# Patient Record
Sex: FEMALE | Race: WHITE | NOT HISPANIC OR LATINO | Employment: OTHER | URBAN - METROPOLITAN AREA
[De-identification: names, ages, dates, MRNs, and addresses within clinical notes are randomized per-mention and may not be internally consistent; named-entity substitution may affect disease eponyms.]

---

## 2023-05-20 ENCOUNTER — HOSPITAL ENCOUNTER (EMERGENCY)
Facility: HOSPITAL | Age: 73
Discharge: HOME | End: 2023-05-20
Attending: EMERGENCY MEDICINE | Admitting: EMERGENCY MEDICINE
Payer: MEDICARE

## 2023-05-20 ENCOUNTER — APPOINTMENT (OUTPATIENT)
Dept: CT IMAGING | Facility: HOSPITAL | Age: 73
End: 2023-05-20
Attending: EMERGENCY MEDICINE
Payer: MEDICARE

## 2023-05-20 ENCOUNTER — APPOINTMENT (OUTPATIENT)
Dept: RADIOLOGY | Facility: HOSPITAL | Age: 73
End: 2023-05-20
Attending: STUDENT IN AN ORGANIZED HEALTH CARE EDUCATION/TRAINING PROGRAM
Payer: MEDICARE

## 2023-05-20 VITALS
DIASTOLIC BLOOD PRESSURE: 63 MMHG | WEIGHT: 200 LBS | HEIGHT: 63 IN | BODY MASS INDEX: 35.44 KG/M2 | TEMPERATURE: 96.9 F | SYSTOLIC BLOOD PRESSURE: 136 MMHG | RESPIRATION RATE: 18 BRPM | OXYGEN SATURATION: 99 % | HEART RATE: 57 BPM
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
## 2023-05-20 DIAGNOSIS — S80.02XA CONTUSION OF LEFT KNEE, INITIAL ENCOUNTER: ICD-10-CM

## 2023-05-20 DIAGNOSIS — S42.291A OTHER CLOSED DISPLACED FRACTURE OF PROXIMAL END OF RIGHT HUMERUS, INITIAL ENCOUNTER: ICD-10-CM

## 2023-05-20 PROBLEM — I10 ESSENTIAL HYPERTENSION: Status: ACTIVE | Noted: 2022-08-24

## 2023-05-20 PROBLEM — E66.9 OBESITY (BMI 30-39.9): Status: ACTIVE | Noted: 2018-02-20

## 2023-05-20 PROBLEM — I83.813 VARICOSE VEINS OF BILATERAL LOWER EXTREMITIES WITH PAIN: Status: ACTIVE | Noted: 2023-02-10

## 2023-05-20 PROBLEM — E03.9 HYPOTHYROID: Status: ACTIVE | Noted: 2022-08-24

## 2023-05-20 PROBLEM — I48.91 ATRIAL FIBRILLATION (H): Status: ACTIVE | Noted: 2023-05-20

## 2023-05-20 LAB
BASOPHILS # BLD AUTO: 0.1 10E3/UL (ref 0–0.2)
BASOPHILS NFR BLD AUTO: 1 %
EOSINOPHIL # BLD AUTO: 0.2 10E3/UL (ref 0–0.7)
EOSINOPHIL NFR BLD AUTO: 2 %
ERYTHROCYTE [DISTWIDTH] IN BLOOD BY AUTOMATED COUNT: 13.8 % (ref 10–15)
HCT VFR BLD AUTO: 42 % (ref 35–47)
HGB BLD-MCNC: 13.4 G/DL (ref 11.7–15.7)
HOLD SPECIMEN: NORMAL
IMM GRANULOCYTES # BLD: 0 10E3/UL
IMM GRANULOCYTES NFR BLD: 0 %
LYMPHOCYTES # BLD AUTO: 1.6 10E3/UL (ref 0.8–5.3)
LYMPHOCYTES NFR BLD AUTO: 16 %
MCH RBC QN AUTO: 28.1 PG (ref 26.5–33)
MCHC RBC AUTO-ENTMCNC: 31.9 G/DL (ref 31.5–36.5)
MCV RBC AUTO: 88 FL (ref 78–100)
MONOCYTES # BLD AUTO: 0.6 10E3/UL (ref 0–1.3)
MONOCYTES NFR BLD AUTO: 6 %
NEUTROPHILS # BLD AUTO: 7.4 10E3/UL (ref 1.6–8.3)
NEUTROPHILS NFR BLD AUTO: 75 %
NRBC # BLD AUTO: 0 10E3/UL
NRBC BLD AUTO-RTO: 0 /100
PLATELET # BLD AUTO: 355 10E3/UL (ref 150–450)
RBC # BLD AUTO: 4.77 10E6/UL (ref 3.8–5.2)
WBC # BLD AUTO: 9.9 10E3/UL (ref 4–11)

## 2023-05-20 PROCEDURE — 250N000013 HC RX MED GY IP 250 OP 250 PS 637: Performed by: EMERGENCY MEDICINE

## 2023-05-20 PROCEDURE — 73060 X-RAY EXAM OF HUMERUS: CPT | Mod: RT

## 2023-05-20 PROCEDURE — 70450 CT HEAD/BRAIN W/O DYE: CPT

## 2023-05-20 PROCEDURE — 73562 X-RAY EXAM OF KNEE 3: CPT | Mod: LT

## 2023-05-20 PROCEDURE — 36415 COLL VENOUS BLD VENIPUNCTURE: CPT | Performed by: STUDENT IN AN ORGANIZED HEALTH CARE EDUCATION/TRAINING PROGRAM

## 2023-05-20 PROCEDURE — 23600 CLTX PROX HUMRL FX W/O MNPJ: CPT | Mod: RT

## 2023-05-20 PROCEDURE — 99285 EMERGENCY DEPT VISIT HI MDM: CPT | Mod: 25

## 2023-05-20 PROCEDURE — 85014 HEMATOCRIT: CPT | Performed by: STUDENT IN AN ORGANIZED HEALTH CARE EDUCATION/TRAINING PROGRAM

## 2023-05-20 PROCEDURE — 85025 COMPLETE CBC W/AUTO DIFF WBC: CPT | Performed by: EMERGENCY MEDICINE

## 2023-05-20 RX ORDER — OXYCODONE AND ACETAMINOPHEN 5; 325 MG/1; MG/1
1 TABLET ORAL ONCE
Status: COMPLETED | OUTPATIENT
Start: 2023-05-20 | End: 2023-05-20

## 2023-05-20 RX ORDER — OXYCODONE HYDROCHLORIDE 5 MG/1
5 TABLET ORAL EVERY 6 HOURS PRN
Qty: 12 TABLET | Refills: 0 | Status: SHIPPED | OUTPATIENT
Start: 2023-05-20 | End: 2023-05-23

## 2023-05-20 RX ADMIN — OXYCODONE HYDROCHLORIDE AND ACETAMINOPHEN 1 TABLET: 5; 325 TABLET ORAL at 14:29

## 2023-05-20 ASSESSMENT — ACTIVITIES OF DAILY LIVING (ADL)
ADLS_ACUITY_SCORE: 35

## 2023-05-20 NOTE — DISCHARGE INSTRUCTIONS
You are seen today for evaluation after a fall.  We did a head CT which showed no acute bleed.  We did a knee x-ray which showed no acute fracture.  We did an x-ray of your right humerus which does show a fracture.  Wear the sling.  You can use the Ace wrap as needed as well for support.  You may benefit from sleeping in a recliner for several days to help with pain control.  Take the oxycodone as needed for pain not relieved by Tylenol.  Follow-up with orthopedics as an outpatient.

## 2023-05-20 NOTE — ED NOTES
"Pt feeling nervous /anxious about taking pain medication.  Wondering if this will be to much and or not enough once the provider starts to manipulate arm.  Pt states arm feels like it just not there numb hard to describe.  +radial pulse, pt able to move fingers, warm to touch.  Pain is upper right arm.  Reassured pt it was ok to take pain pill and that if needed we could always place an iv and give pain meds that way too.  Pt apologetic for \"being indecisive\".  Informed pt no need to apologize.Allowed pt to vent feelings.  Family at bedside.  "

## 2023-05-20 NOTE — ED PROVIDER NOTES
EMERGENCY DEPARTMENT ENCOUNTER      NAME: Clare Benitez  AGE: 72 year old female  YOB: 1950  MRN: [id number]  EVALUATION DATE & TIME: 5/20/2023  1:40 PM    PCP: No Ref-Primary, Physician    ED PROVIDER: Lashay Ash M.D.      Chief Complaint   Patient presents with     Fall     Arm Injury     FINAL IMPRESSION:  1. Fall, initial encounter    2. Other closed displaced fracture of proximal end of right humerus, initial encounter    3. Contusion of left knee, initial encounter        ED COURSE & MEDICAL DECISION MAKING:    Pertinent Labs & Imaging studies reviewed. (See chart for details)  ED Course as of 05/20/23 1858   Sat May 20, 2023   1541 Patient is a pleasant 72-year-old female who comes in today for evaluation of right arm pain as well as some left knee pain.  She reports that she was at a ballet recital at her [employer] watching her grandchildren.  She was walking afterward and has some difficulty with depth perception because of a history of glaucoma and there is a small rise in the concrete and she caught her toe on it and fell.  It sounds like she was a few feet from the door and initially landed on her right arm and then may have hit her head on the glass door a little bit.  She denies any head pain.  She has no obvious signs of trauma.  She is not on any blood thinners.  We discussed risks and benefits of CT imaging and ultimately given her age and the injury today we opted to get a CT scan of her head.  I think it is reasonable and we will make sure she does not have an intracranial hemorrhage.  She is from [place] and will be driving back home in the next few days.  On her x-ray imaging her knee showed some arthritis but no acute fracture.  She had some tenderness to the lateral part of the anterior knee but extensor mechanism is intact.  She has a lot of tenderness to the midshaft humerus with obvious fracture on x-ray.  We did place her in a sling.  I did wrap her with an Ace  wrap.  She is not sure if that feels better or not.  We discussed that this is typically nonoperative management and that it should heal well.  She is right-handed so we will have to use her left hand and get a lot of assistance.  She is here with her son and her .  She did get some Percocet and her pain is pretty well controlled with the Percocet.  We talked about using that for pain control and also making sure she is on a stool softener as it can be quite constipating.  She is comfortable with all that.  The hope would be to discharge her home after that head CT comes back as long as it looks okay.  We talked about sleeping in a recliner as that will help with the pain.  She was comfortable with the plan.   1705 I reviewed the patient's head CT.  It shows no acute bleeding.  We will work on getting the patient discharged home shortly.   1756 Checked back in on the patient.  I reviewed her head CT results which look good.  We will get her discharged home.  We will send a prescription to her pharmacy.       Medical Decision Making    History:    Supplemental history from: Patient, Patient's  and Patient's son  External Record(s) reviewed: I reviewed the patient's cardiology note from 11/28/2022.  She has history of atrial fibrillation and hypertension.  She had had some spontaneous bleeds when she was on warfarin.    Work Up:    Emergent/Severe conditions considered and evaluated for: Intracranial hemorrhage, arm fracture, knee fracture    I independently reviewed and interpreted humerus x-ray which showed an acute displaced fracture    In additional to work up documented, I considered the following work up: None    Medications given that require intensive monitoring for toxicity: None    External consultation:    Discussion of management with another provider: N/A    Complicating factors:    Care impacted by chronic illness: Glaucoma which contributed to her fall today    Care affected by social  determinants of health: None    Disposition considerations: Considered admission given that she has a fracture but her pain was well controlled and she was able to discharge home.  Prescriptions considered/prescribed: Oxycodone    3:14 PM Introduced myself to the patient, obtained history of present illness, and performed initial physical exam at this time.   5:55 PM I rechecked and updated the patient on CT results, we discussed plan of discharge, patient and family are agreeable at this time.       At the conclusion of the encounter I discussed  the results of all of the tests and the disposition with patient.   All questions were answered.  The patient acknowledged understanding and was involved in the decision making regarding the overall care plan.      I discussed with patient the utility, limitations and findings of the exam/interventions/studies done during this visit as well as the list of differential diagnosis and symptoms to monitor/return to ER for.  Additional verbal discharge instructions were provided.     MEDICATIONS GIVEN IN THE EMERGENCY:  Medications   oxyCODONE-acetaminophen (PERCOCET) 5-325 MG per tablet 1 tablet (1 tablet Oral $Given 5/20/23 3433)       NEW PRESCRIPTIONS STARTED AT TODAY'S ER VISIT  New Prescriptions    OXYCODONE (ROXICODONE) 5 MG TABLET    Take 1 tablet (5 mg) by mouth every 6 hours as needed for pain          =================================================================    HPI    Triage Note:   Pt presents with right arm in makeshift ace wrap sling after a fall at a dance recital. Pt fell against a door after tripping on a ledge. Left knee also painful. Able to wiggles fingers, pulse palpable. No blood thinners.     Triage Assessment     Row Name 05/20/23 0964       Triage Assessment (Adult)    Airway WDL WDL       Respiratory WDL    Respiratory WDL WDL       Skin Circulation/Temperature WDL    Skin Circulation/Temperature WDL WDL       Cardiac WDL    Cardiac WDL WDL        Peripheral/Neurovascular WDL    Peripheral Neurovascular WDL WDL       Cognitive/Neuro/Behavioral WDL    Cognitive/Neuro/Behavioral WDL WDL                  Patient information was obtained from: Patient and Patient's     Use of : N/A         Clare Benitez is a 72 year old female, with a past medical history of atrial fibrillation, hypertension, obesity who presents with fall related injuries.     Patient is from [place], visiting her son, today she was at a ballet recital for her grand kids. While going into the building around [id number], she tripped on the edge of the sidewalk and fall onto her right arm, and stumbled into the glass door. They explain that there was a medical doctor on the scene, who was able to stabilize her right arm with an ACE wrap. She additionally notes pain/soreness in her knees, but notes that she has had a long history of knee problems and should get replacements, but hasn't done so.     She denies blood thinners, states she uses low dose baby aspirin. She notes she was on blood thinners for a while for atrial fibrillation.     Patient attributes this fall, to her issues with depth perception as she has glaucoma and eye surgeries. She states she has fallen for this reason before.    PAST MEDICAL HISTORY:  No past medical history on file.    PAST SURGICAL HISTORY:  No past surgical history on file.    CURRENT MEDICATIONS:    No current facility-administered medications for this encounter.    Current Outpatient Medications:      oxyCODONE (ROXICODONE) 5 MG tablet, Take 1 tablet (5 mg) by mouth every 6 hours as needed for pain, Disp: 12 tablet, Rfl: 0    ALLERGIES:  Allergies   Allergen Reactions     Other Drug Allergy (See Comments) Other (See Comments)     Unknown Abx causes mouth sores.       FAMILY HISTORY:  No family history on file.    SOCIAL HISTORY:   Social History     Socioeconomic History     Marital status:        PHYSICAL EXAM    VITAL SIGNS:  "/63   Pulse 57   Temp 96.9  F (36.1  C) (Temporal)   Resp 18   Ht 1.6 m (5' 3\")   Wt 90.7 kg (200 lb)   SpO2 99%   BMI 35.43 kg/m     GENERAL: Awake, alert, answering questions appropriately, patient appears mildly uncomfortable, no palpable scalp hematoma or laceration  SPEECH:  Easy to understand speech, Normal volume and francine  PULMONARY: No respiratory distress, Lungs clear to auscultation bilaterally  CARDIOVASCULAR: Regular rate and rhythm, Distal pulses present and normal.  ABDOMINAL: Soft, Nondistended, Nontender, No rebound or guarding, No palpable masses  EXTREMITIES: Tenderness to the right upper arm.  Some mild tenderness to the right posterior shoulder.  Shoulder joint seems to be sitting in the glenoid fossa.  Mild tenderness to the left lateral knee with little bit of swelling.  Extensor mechanism is intact.  Distal pulses are intact bilaterally  PSYCH: Normal mood and affect     LAB:  All pertinent labs reviewed and interpreted.  Results for orders placed or performed during the hospital encounter of 05/20/23   XR Humerus Right G/E 2 Views    Impression    IMPRESSION: Acute moderately displaced (3 cm) spiral oblique fracture of the middle third of the right humeral diaphysis. Normal glenohumeral and elbow joint alignment. Moderate soft tissue swelling in the upper arm.   XR Knee Left 3 Views    Impression    IMPRESSION: Prepatellar soft tissue swelling. No fracture or dislocation. No significant joint effusion. Advanced degenerative joint space narrowing and arthritic spurring in the medial and patellofemoral compartments.   Head CT w/o contrast    Impression    IMPRESSION:  1.  No CT finding of a mass, hemorrhage or focal area suggestive of acute infarct.  2.  Minimal small vessel ischemic disease.   CBC with platelets and differential   Result Value Ref Range    WBC Count 9.9 4.0 - 11.0 10e3/uL    RBC Count 4.77 3.80 - 5.20 10e6/uL    Hemoglobin 13.4 11.7 - 15.7 g/dL    Hematocrit " 42.0 35.0 - 47.0 %    MCV 88 78 - 100 fL    MCH 28.1 26.5 - 33.0 pg    MCHC 31.9 31.5 - 36.5 g/dL    RDW 13.8 10.0 - 15.0 %    Platelet Count 355 150 - 450 10e3/uL    % Neutrophils 75 %    % Lymphocytes 16 %    % Monocytes 6 %    % Eosinophils 2 %    % Basophils 1 %    % Immature Granulocytes 0 %    NRBCs per 100 WBC 0 <1 /100    Absolute Neutrophils 7.4 1.6 - 8.3 10e3/uL    Absolute Lymphocytes 1.6 0.8 - 5.3 10e3/uL    Absolute Monocytes 0.6 0.0 - 1.3 10e3/uL    Absolute Eosinophils 0.2 0.0 - 0.7 10e3/uL    Absolute Basophils 0.1 0.0 - 0.2 10e3/uL    Absolute Immature Granulocytes 0.0 <=0.4 10e3/uL    Absolute NRBCs 0.0 10e3/uL   Extra Blue Top Tube   Result Value Ref Range    Hold Specimen JIC    Extra Red Top Tube   Result Value Ref Range    Hold Specimen JIC    Extra Green Top (Lithium Heparin) Tube   Result Value Ref Range    Hold Specimen JIC        RADIOLOGY:  Head CT w/o contrast   Final Result   IMPRESSION:   1.  No CT finding of a mass, hemorrhage or focal area suggestive of acute infarct.   2.  Minimal small vessel ischemic disease.      XR Knee Left 3 Views   Final Result   IMPRESSION: Prepatellar soft tissue swelling. No fracture or dislocation. No significant joint effusion. Advanced degenerative joint space narrowing and arthritic spurring in the medial and patellofemoral compartments.      XR Humerus Right G/E 2 Views   Final Result   IMPRESSION: Acute moderately displaced (3 cm) spiral oblique fracture of the middle third of the right humeral diaphysis. Normal glenohumeral and elbow joint alignment. Moderate soft tissue swelling in the upper arm.          I, Alexandra Vargas, am serving as a scribe to document services personally performed by Dr. Ash based on my observation and the provider's statements to me. I, Lashay Ash MD attest that Alexandra Vargas is acting in a scribe capacity, has observed my performance of the services and has documented them in accordance with my direction.    Lashay  JUAN Ash.  Emergency Medicine  [facility] EMERGENCY DEPARTMENT  [facility] [facility] [id number] [phone]  Dept: [phone]     Lashay Ash MD  05/20/23 8063

## 2023-05-20 NOTE — ED NOTES
Pt with left arm pain, pt reports feeling cold and tense along with having muscle spasms.  Pt wondering how soon provider will be in to see her and what the plan is.

## 2023-05-20 NOTE — ED TRIAGE NOTES
Pt presents with right arm in makeshift ace wrap sling after a fall at a dance recital. Pt fell against a door after tripping on a ledge. Left knee also painful. Able to wiggles fingers, pulse palpable. No blood thinners.     Triage Assessment     Row Name 05/20/23 1229       Triage Assessment (Adult)    Airway WDL WDL       Respiratory WDL    Respiratory WDL WDL       Skin Circulation/Temperature WDL    Skin Circulation/Temperature WDL WDL       Cardiac WDL    Cardiac WDL WDL       Peripheral/Neurovascular WDL    Peripheral Neurovascular WDL WDL       Cognitive/Neuro/Behavioral WDL    Cognitive/Neuro/Behavioral WDL WDL

## 2023-12-31 ENCOUNTER — HEALTH MAINTENANCE LETTER (OUTPATIENT)
Age: 73
End: 2023-12-31

## 2024-07-28 ENCOUNTER — HEALTH MAINTENANCE LETTER (OUTPATIENT)
Age: 74
End: 2024-07-28

## 2025-01-19 ENCOUNTER — HEALTH MAINTENANCE LETTER (OUTPATIENT)
Age: 75
End: 2025-01-19